# Patient Record
Sex: MALE | Race: WHITE | NOT HISPANIC OR LATINO | Employment: FULL TIME | URBAN - METROPOLITAN AREA
[De-identification: names, ages, dates, MRNs, and addresses within clinical notes are randomized per-mention and may not be internally consistent; named-entity substitution may affect disease eponyms.]

---

## 2023-03-31 LAB — LIPOPROTEIN A SER/PLAS: 123 MG/DL

## 2023-05-28 LAB — TESTOSTERONE (NG/DL) IN SER/PLAS: NORMAL

## 2023-05-30 LAB
ALANINE AMINOTRANSFERASE (SGPT) (U/L) IN SER/PLAS: 59 U/L (ref 10–52)
ALBUMIN (G/DL) IN SER/PLAS: 4.7 G/DL (ref 3.4–5)
ALKALINE PHOSPHATASE (U/L) IN SER/PLAS: 51 U/L (ref 33–120)
AMPHETAMINE (PRESENCE) IN URINE BY SCREEN METHOD: NORMAL
ANION GAP IN SER/PLAS: 9 MMOL/L (ref 10–20)
ASPARTATE AMINOTRANSFERASE (SGOT) (U/L) IN SER/PLAS: 39 U/L (ref 9–39)
BARBITURATES PRESENCE IN URINE BY SCREEN METHOD: NORMAL
BENZODIAZEPINE (PRESENCE) IN URINE BY SCREEN METHOD: NORMAL
BILIRUBIN TOTAL (MG/DL) IN SER/PLAS: 0.9 MG/DL (ref 0–1.2)
CALCIUM (MG/DL) IN SER/PLAS: 9.9 MG/DL (ref 8.6–10.3)
CANNABINOIDS IN URINE BY SCREEN METHOD: NORMAL
CARBON DIOXIDE, TOTAL (MMOL/L) IN SER/PLAS: 31 MMOL/L (ref 21–32)
CHLORIDE (MMOL/L) IN SER/PLAS: 103 MMOL/L (ref 98–107)
CHOLESTEROL (MG/DL) IN SER/PLAS: 128 MG/DL (ref 0–199)
CHOLESTEROL IN HDL (MG/DL) IN SER/PLAS: 45.9 MG/DL
CHOLESTEROL/HDL RATIO: 2.8
COCAINE (PRESENCE) IN URINE BY SCREEN METHOD: NORMAL
CREATININE (MG/DL) IN SER/PLAS: 0.94 MG/DL (ref 0.5–1.3)
DRUG SCREEN COMMENT URINE: NORMAL
FENTANYL URINE: NORMAL
GFR MALE: >90 ML/MIN/1.73M2
GLUCOSE (MG/DL) IN SER/PLAS: 89 MG/DL (ref 74–99)
LDL: 61 MG/DL (ref 0–99)
METHADONE (PRESENCE) IN URINE BY SCREEN METHOD: NORMAL
OPIATES (PRESENCE) IN URINE BY SCREEN METHOD: NORMAL
OXYCODONE (PRESENCE) IN URINE BY SCREEN METHOD: NORMAL
PHENCYCLIDINE (PRESENCE) IN URINE BY SCREEN METHOD: NORMAL
POTASSIUM (MMOL/L) IN SER/PLAS: 4.4 MMOL/L (ref 3.5–5.3)
PROTEIN TOTAL: 6.9 G/DL (ref 6.4–8.2)
SODIUM (MMOL/L) IN SER/PLAS: 139 MMOL/L (ref 136–145)
TESTOSTERONE (NG/DL) IN SER/PLAS: 1256 NG/DL (ref 240–1000)
TRIGLYCERIDE (MG/DL) IN SER/PLAS: 107 MG/DL (ref 0–149)
UREA NITROGEN (MG/DL) IN SER/PLAS: 20 MG/DL (ref 6–23)
VLDL: 21 MG/DL (ref 0–40)

## 2023-08-19 LAB — TESTOSTERONE (NG/DL) IN SER/PLAS: 1348 NG/DL (ref 240–1000)

## 2023-11-07 ENCOUNTER — LAB (OUTPATIENT)
Dept: LAB | Facility: LAB | Age: 43
End: 2023-11-07
Payer: COMMERCIAL

## 2023-11-07 ENCOUNTER — OFFICE VISIT (OUTPATIENT)
Dept: PRIMARY CARE | Facility: CLINIC | Age: 43
End: 2023-11-07
Payer: COMMERCIAL

## 2023-11-07 VITALS
SYSTOLIC BLOOD PRESSURE: 116 MMHG | HEIGHT: 72 IN | HEART RATE: 72 BPM | BODY MASS INDEX: 30.02 KG/M2 | WEIGHT: 221.6 LBS | OXYGEN SATURATION: 98 % | DIASTOLIC BLOOD PRESSURE: 68 MMHG | TEMPERATURE: 98.7 F

## 2023-11-07 DIAGNOSIS — E78.5 HYPERLIPIDEMIA, UNSPECIFIED HYPERLIPIDEMIA TYPE: ICD-10-CM

## 2023-11-07 DIAGNOSIS — E29.1 HYPOGONADISM IN MALE: ICD-10-CM

## 2023-11-07 DIAGNOSIS — E78.41 HIGH SERUM LIPOPROTEIN(A): ICD-10-CM

## 2023-11-07 DIAGNOSIS — K58.9 IRRITABLE BOWEL SYNDROME, UNSPECIFIED TYPE: ICD-10-CM

## 2023-11-07 LAB
ALBUMIN SERPL BCP-MCNC: 4.5 G/DL (ref 3.4–5)
ALP SERPL-CCNC: 51 U/L (ref 33–120)
ALT SERPL W P-5'-P-CCNC: 55 U/L (ref 10–52)
ANION GAP SERPL CALC-SCNC: 11 MMOL/L (ref 10–20)
AST SERPL W P-5'-P-CCNC: 38 U/L (ref 9–39)
BILIRUB SERPL-MCNC: 0.9 MG/DL (ref 0–1.2)
BUN SERPL-MCNC: 16 MG/DL (ref 6–23)
CALCIUM SERPL-MCNC: 9.3 MG/DL (ref 8.6–10.3)
CHLORIDE SERPL-SCNC: 104 MMOL/L (ref 98–107)
CHOLEST SERPL-MCNC: 126 MG/DL (ref 0–199)
CHOLESTEROL/HDL RATIO: 3.4
CO2 SERPL-SCNC: 28 MMOL/L (ref 21–32)
CREAT SERPL-MCNC: 0.95 MG/DL (ref 0.5–1.3)
GFR SERPL CREATININE-BSD FRML MDRD: >90 ML/MIN/1.73M*2
GLUCOSE SERPL-MCNC: 93 MG/DL (ref 74–99)
HDLC SERPL-MCNC: 36.6 MG/DL
NON-HDL CHOLESTEROL: 89 MG/DL (ref 0–149)
POTASSIUM SERPL-SCNC: 4 MMOL/L (ref 3.5–5.3)
PROT SERPL-MCNC: 6.5 G/DL (ref 6.4–8.2)
SODIUM SERPL-SCNC: 139 MMOL/L (ref 136–145)
TESTOST SERPL-MCNC: 783 NG/DL (ref 240–1000)

## 2023-11-07 PROCEDURE — 36415 COLL VENOUS BLD VENIPUNCTURE: CPT

## 2023-11-07 PROCEDURE — 1036F TOBACCO NON-USER: CPT | Performed by: FAMILY MEDICINE

## 2023-11-07 PROCEDURE — 80053 COMPREHEN METABOLIC PANEL: CPT

## 2023-11-07 PROCEDURE — 82465 ASSAY BLD/SERUM CHOLESTEROL: CPT

## 2023-11-07 PROCEDURE — 84403 ASSAY OF TOTAL TESTOSTERONE: CPT

## 2023-11-07 PROCEDURE — 83718 ASSAY OF LIPOPROTEIN: CPT

## 2023-11-07 PROCEDURE — 99214 OFFICE O/P EST MOD 30 MIN: CPT | Performed by: FAMILY MEDICINE

## 2023-11-07 RX ORDER — ROSUVASTATIN CALCIUM 40 MG/1
40 TABLET, COATED ORAL NIGHTLY
Qty: 90 TABLET | Refills: 1 | Status: SHIPPED | OUTPATIENT
Start: 2023-11-07 | End: 2024-05-06 | Stop reason: SDUPTHER

## 2023-11-07 RX ORDER — TESTOSTERONE CYPIONATE 200 MG/ML
200 INJECTION, SOLUTION INTRAMUSCULAR
Qty: 4 ML | Refills: 2 | Status: SHIPPED | OUTPATIENT
Start: 2023-11-07 | End: 2024-02-12 | Stop reason: SDUPTHER

## 2023-11-07 RX ORDER — TESTOSTERONE CYPIONATE 200 MG/ML
200 INJECTION, SOLUTION INTRAMUSCULAR
COMMUNITY
End: 2023-11-07 | Stop reason: SDUPTHER

## 2023-11-07 RX ORDER — DICYCLOMINE HYDROCHLORIDE 20 MG/1
20 TABLET ORAL 3 TIMES DAILY
Qty: 90 TABLET | Refills: 2 | Status: SHIPPED | OUTPATIENT
Start: 2023-11-07 | End: 2024-02-12 | Stop reason: WASHOUT

## 2023-11-07 NOTE — PROGRESS NOTES
Subjective   Patient ID: Zeke Cifuentes is a 43 y.o. male who presents for Med Refill.    HPI Pt is here for refills of medications to treat the following medical conditions. Unless otherwise stated, these conditions are well-controlled, pt is taking medications s Rx, and there are no reported side effects to medications or other treatment: Hyperlipidemia, IBS, Hypogonadism in male.   Last injection was 24 hrs prior to this visit.    Review of Systems   All other systems reviewed and are negative.      Objective   /68   Pulse 72   Temp 37.1 °C (98.7 °F)   Ht 1.829 m (6')   Wt 101 kg (221 lb 9.6 oz)   SpO2 98%   BMI 30.05 kg/m²     Physical Exam  Constitutional:       Appearance: Normal appearance.   Eyes:      Conjunctiva/sclera: Conjunctivae normal.   Cardiovascular:      Rate and Rhythm: Normal rate and regular rhythm.   Pulmonary:      Effort: Pulmonary effort is normal.      Breath sounds: Normal breath sounds.   Abdominal:      Palpations: Abdomen is soft.   Musculoskeletal:         General: Normal range of motion.   Skin:     General: Skin is warm and dry.   Neurological:      Mental Status: He is alert.   Psychiatric:         Mood and Affect: Mood normal.         Assessment/Plan Chronic medications refilled per protocol. Will have pt RTC for a trough reading in his testosterone. He is doing once weekly injections so we will have him draw just prior to his injection. He'll RTC in the next week or two to have this comopleted. We will adjust dose as needed. Otherwise, RTC 1 month

## 2023-11-08 ENCOUNTER — LAB (OUTPATIENT)
Dept: LAB | Facility: LAB | Age: 43
End: 2023-11-08
Payer: COMMERCIAL

## 2023-11-08 DIAGNOSIS — K52.9 CHRONIC DIARRHEA: Primary | ICD-10-CM

## 2023-11-08 DIAGNOSIS — K52.9 CHRONIC DIARRHEA: ICD-10-CM

## 2023-11-08 PROCEDURE — 83630 LACTOFERRIN FECAL (QUAL): CPT

## 2023-11-08 PROCEDURE — 87328 CRYPTOSPORIDIUM AG IA: CPT

## 2023-11-08 PROCEDURE — 87329 GIARDIA AG IA: CPT

## 2023-11-08 NOTE — PROGRESS NOTES
Pt stopped by the office, Dr. Zaidi wants him to complete stool studies for chronic diarrhea. Orders placed and stool sample kit given to pt. Advised to return to the lab Monday-Thursday 8-4:30.

## 2023-11-10 LAB — LACTOFERRIN STL QL IA: POSITIVE

## 2023-11-11 DIAGNOSIS — K52.9 COLITIS: Primary | ICD-10-CM

## 2023-11-11 LAB
CRYPTOSP AG STL QL IA: NEGATIVE
G LAMBLIA AG STL QL IA: NEGATIVE

## 2023-11-11 RX ORDER — METHYLPREDNISOLONE 4 MG/1
TABLET ORAL
Qty: 21 TABLET | Refills: 0 | Status: SHIPPED | OUTPATIENT
Start: 2023-11-11 | End: 2024-02-12 | Stop reason: WASHOUT

## 2023-11-11 NOTE — PROGRESS NOTES
Patient called PCP (supervising physician of this provider) and reported stomach problems. Dr. Zaidi requested Medrol dose pack for colitis sent in for patient.

## 2023-11-13 LAB — O+P STL MICRO: NEGATIVE

## 2023-12-29 DIAGNOSIS — E29.1 HYPOGONADISM IN MALE: ICD-10-CM

## 2024-01-09 ENCOUNTER — LAB (OUTPATIENT)
Dept: LAB | Facility: LAB | Age: 44
End: 2024-01-09
Payer: COMMERCIAL

## 2024-01-09 DIAGNOSIS — E29.1 HYPOGONADISM IN MALE: ICD-10-CM

## 2024-01-09 LAB — TESTOST SERPL-MCNC: 668 NG/DL (ref 240–1000)

## 2024-01-09 PROCEDURE — 36415 COLL VENOUS BLD VENIPUNCTURE: CPT

## 2024-01-09 PROCEDURE — 84403 ASSAY OF TOTAL TESTOSTERONE: CPT

## 2024-02-06 DIAGNOSIS — E29.1 HYPOGONADISM IN MALE: ICD-10-CM

## 2024-02-09 RX ORDER — TESTOSTERONE CYPIONATE 200 MG/ML
200 INJECTION, SOLUTION INTRAMUSCULAR
Qty: 4 ML | Refills: 0 | OUTPATIENT
Start: 2024-02-09

## 2024-02-12 ENCOUNTER — OFFICE VISIT (OUTPATIENT)
Dept: PRIMARY CARE | Facility: CLINIC | Age: 44
End: 2024-02-12
Payer: COMMERCIAL

## 2024-02-12 VITALS
DIASTOLIC BLOOD PRESSURE: 64 MMHG | SYSTOLIC BLOOD PRESSURE: 122 MMHG | HEIGHT: 72 IN | HEART RATE: 79 BPM | WEIGHT: 228 LBS | OXYGEN SATURATION: 98 % | TEMPERATURE: 98.7 F | BODY MASS INDEX: 30.88 KG/M2

## 2024-02-12 DIAGNOSIS — E29.1 HYPOGONADISM IN MALE: ICD-10-CM

## 2024-02-12 DIAGNOSIS — E78.5 HYPERLIPIDEMIA, UNSPECIFIED HYPERLIPIDEMIA TYPE: Primary | ICD-10-CM

## 2024-02-12 PROCEDURE — 1036F TOBACCO NON-USER: CPT | Performed by: FAMILY MEDICINE

## 2024-02-12 PROCEDURE — 99214 OFFICE O/P EST MOD 30 MIN: CPT | Performed by: FAMILY MEDICINE

## 2024-02-12 RX ORDER — TAMSULOSIN HYDROCHLORIDE 0.4 MG/1
0.4 CAPSULE ORAL DAILY
COMMUNITY
End: 2024-03-15 | Stop reason: WASHOUT

## 2024-02-12 RX ORDER — TESTOSTERONE CYPIONATE 200 MG/ML
200 INJECTION, SOLUTION INTRAMUSCULAR
Qty: 4 ML | Refills: 2 | Status: SHIPPED | OUTPATIENT
Start: 2024-02-12 | End: 2024-05-06 | Stop reason: SDUPTHER

## 2024-02-12 NOTE — PROGRESS NOTES
Subjective   Patient ID: Zeke Cifuentes is a 43 y.o. male who presents for Med Refill.    HPI Pt is here for refills of medications to treat the following medical conditions. Unless otherwise stated, these conditions are well-controlled, pt is taking medications s Rx, and there are no reported side effects to medications or other treatment: Hypogonadism in male. Last injection was on 2/6/2024. Last injection level done pm 1/9/2024 with result of 668.     Review of Systems   All other systems reviewed and are negative.      Objective   /64   Pulse 79   Temp 37.1 °C (98.7 °F)   Ht 1.829 m (6')   Wt 103 kg (228 lb)   SpO2 98%   BMI 30.92 kg/m²     Physical Exam  Constitutional:       Appearance: Normal appearance.   Eyes:      Conjunctiva/sclera: Conjunctivae normal.   Cardiovascular:      Rate and Rhythm: Normal rate and regular rhythm.   Pulmonary:      Effort: Pulmonary effort is normal.      Breath sounds: Normal breath sounds.   Abdominal:      Palpations: Abdomen is soft.   Musculoskeletal:         General: Normal range of motion.   Skin:     General: Skin is warm and dry.   Neurological:      Mental Status: He is alert.   Psychiatric:         Mood and Affect: Mood normal.         Assessment/Plan Reviewed lab work with pt. Will get lipid and testosterone at his next lab draw as he is due for lipid.

## 2024-02-28 ENCOUNTER — TELEMEDICINE (OUTPATIENT)
Dept: PRIMARY CARE | Facility: CLINIC | Age: 44
End: 2024-02-28
Payer: COMMERCIAL

## 2024-02-28 DIAGNOSIS — U07.1 COVID-19: Primary | ICD-10-CM

## 2024-02-28 PROCEDURE — 99213 OFFICE O/P EST LOW 20 MIN: CPT | Performed by: FAMILY MEDICINE

## 2024-02-28 PROCEDURE — 1036F TOBACCO NON-USER: CPT | Performed by: FAMILY MEDICINE

## 2024-02-28 RX ORDER — NIRMATRELVIR AND RITONAVIR 300-100 MG
3 KIT ORAL 2 TIMES DAILY
Qty: 30 TABLET | Refills: 0 | Status: SHIPPED | OUTPATIENT
Start: 2024-02-28 | End: 2024-03-04

## 2024-02-28 RX ORDER — ELUXADOLINE 100 MG/1
1 TABLET, FILM COATED ORAL
COMMUNITY
Start: 2024-02-10

## 2024-02-28 NOTE — PROGRESS NOTES
Subjective   Patient ID: Zeke Cifuentes is a 43 y.o. male who presents for Positive COVID.    HPI     States on 2/26/2024 in the evening around 10 pm and 2/27/2024 he had fever and had no smell and had two positive tests this morning. Took Motrin and Tyelnol which broke the fever, temperature this morning is 99.6 F. C/o fever/chills, body aches, head pounding. Has taken Dayquil and the NetiPot, and has been using Afrin when very congested. Is requesting Rx of Paxlovid.     Review of Systems   All other systems reviewed and are negative.      Objective   There were no vitals taken for this visit.    Physical Exam  Vitals reviewed.   Constitutional:       General: He is awake.      Appearance: Normal appearance. He is well-developed.   HENT:      Head: Normocephalic and atraumatic.   Pulmonary:      Effort: Pulmonary effort is normal.   Musculoskeletal:      Cervical back: Full passive range of motion without pain.   Skin:     General: Skin is warm and dry.      Findings: No rash.   Neurological:      General: No focal deficit present.      Mental Status: He is alert and oriented to person, place, and time.      Cranial Nerves: No facial asymmetry.      Motor: Motor function is intact.      Gait: Gait is intact.   Psychiatric:         Attention and Perception: Attention normal.         Mood and Affect: Mood and affect normal.       Examination limited secondary to virtual visit.      Assessment/Plan   Diagnoses and all orders for this visit:  COVID-19  -     nirmatrelvir-ritonavir (Paxlovid) 300 mg (150 mg x 2)-100 mg tablet therapy pack; Take 3 tablets by mouth 2 times a day for 5 days. Follow the instructions on the package    Increase rest and fluids. Follow [facility] quarantine recommendations. Hold rosuvastatin.

## 2024-03-14 NOTE — PROGRESS NOTES
HPI   5/11/23 - sinus infection about 1 month ago, s/p doxycycline and prednisone. Sx's improved however c/o persistent L sided congestion. Opted to discontinue mupirocin voluntarily 2/2 no significant improvement. He was applying the ointment with his finger previously. Prescribed mupirocin again today and reviewed proper application instructions as well. Otherwise, he is now about 2 years post-op with no recurrence and doing well overall.   3/15/24 - The patient was doing well with the sinuses/nose over the past year. He reports having Covid approximately 20-25 days ago (in mid-to-late February 2024). He reports having nasal obstruction at night. He is hoping this is only related to his allergies. He has been using Xhance for the past 4 days. He is also taking allergy medicine. He has been using Afrin for the past week.    Postoperative Diagnoses:  1. Bilateral chronic maxillary sinusitis  2. Nasal septal deviation  3. Bilateral inferior turbinate hypertrophy  4. Bilateral aisha bullosa  5. Bilateral nasal sinus polyp     Operation/Procedures:  1. Bilateral endoscopic maxillary antrostomies with removal of tissue from sinuses - modifier 22  2. Septoplasty  3. Bilateral inferior turbinate submucous resection  4. Bilateral aisha bullosa resection  5. Imaged guidance navigation - extradural     Operative Findings:  1. 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 Pierson Splint sutured to the septum bilaterally  3. Absorbable hemostatic material in the ethmoids    Surgeon: Jimenez Campbell MD  Assistant(s): None  EBL: 100 ml  Anesthesia: General and local    Review of Systems   Negative for constitutional, eyes, cardiac, pulmonary, hepatic, renal, digestive, hematologic, epileptic, syncopal, musculoskeletal, mental health, integumentary, hypertensive, lipid, arthritic, diabetic, thyroid or neurologic disorders (except as listed in the HPI, PMH and Problem  List).       Assessment   Zeke Cifuentes is a 44 y.o. male h/o CRS, DNS, s/p FESS (maxillary antrostomy, aisha bullosa resection). The patient had surgery 03/15/21.  3/24/21- Expected post op swelling today. Debridement performed as above.  4/15/21- Expected post op swelling, resolving infection of the right maxillary sinus. Debridement performed as above. Continue irrigations and xhance.   5/19/21- Anterior septal crusting, start mupirocin BID, irrigations and Xhance to once daily.  10/20/21- Patient doing well, mild anterior nasal dryness, stopped Xhance.   4/14/22- doing well overall, start mupirocin, avoid blowing nose for 1 month, nasal hygeine  5/11/23 - sinus infection about 1 month ago, s/p doxy and prednisone. Sx's improved. With chronic left nasal dryness. Advised mupirocin x 1 month. Rx and reduce/careful with nose hair trimming.  3/15/24 - No infection. Some ITH. Pt had Covid in mid-to-late February 2024. Rec Xhance daily for at least 1 month, continue for 2 months if not improved. Continue saline irrigations PRN.    Plan   I previously reviewed the notes from Dr. Abraham's office from 02/08/23. This is contributing to my history and assessment: Noted to have an episode of acute sinusitis and given prednisone tablets. Received 10d course of doxycycline prior to last evaluation with Dr. Abraham.     Patient was instructed to stop using Afrin.  Patient was instructed to continue using Xhance, 1 spray in each nostril BID for at least 1 month. If he does not feel improved after 1 month, he was instructed to continue Xhance for 1 additional month. If he does not feel improved after 2 months of Xhance, he was recommended to contact our office.  Recommended nasal saline irrigations as needed. Previously discussed that this should help to clean away crusts more gently than blowing the nose.  Follow up in 1 year.    Jimenez Campbell MD [facility]  Division of Rhinology, Sinus, and Skull Base Surgery       Exam   General:  This is a healthy appearing male who appears his stated age. The patient is alert and appropriately verbally conversant without hoarseness.  Face: The face was inspected and no cutaneous masses or lesions were visualized. There was no erythema or edema noted. Facial movement was symmetric without weakness. No skin lesions were detected.   Eyes: Extra-ocular muscle function was intact. No nystagmus was observed. Pupils were equal.      Nose: Examination of the nose revealed the nasal dorsum to be midline. Intranasal exam reveals the septum is healthy without perforation. The inferior turbinates were hypertrophic and edematous with drainage. See below procedure note as applicable for further exam.    Procedure Note:  Procedure: Nasal endoscopy - bilateral  Indication: Chronic rhinosinusitis, post operative from sinus surgery  Informed Consent obtained: risks, benefits, alternatives, and expectations discussed with patient and the patient wishes to proceed.     Findings: After anesthesia and decongestion with topical lidocaine and Afrin spray, the nasal cavities were examined with a zero and/or 30-degree endoscope. Nasal cavity is patent and clear. The maxillary sinuses are widely patent with mild edema. The inferior turbinates were hypertrophic and edematous with drainage. Otherwise the middle turbinates, superior turbinates, and meatuses are normal and sphenoethmoid recess, nasal cavity and nasopharynx are normal. There is no CSF leak. Well-healed overall. The patient tolerated the procedure well and there were no complications.       Scribe Attestation  By signing my name below, I, Denisse Azar, attest that this documentation has been prepared under the direction and in the presence of Jimenez Campbell MD. All medical record entries made by the Scribe were at my direction or personally dictated by me. I have reviewed the chart and agree that the record accurately reflects my personal performance of the  history, physical exam, discussion and plan.

## 2024-03-14 NOTE — PATIENT INSTRUCTIONS
PATIENT INSTRUCTIONS ARE COMPLETE and READY TO PRINT    Please stop using Afrin regularly because consistent Afrin use can cause your nose to become addicted to it. Please instead use Xhance, which should help reduce the swelling of your turbinates.    Xhance:  Please start using Xhance nasal spray. Do 1 spray in each nostril twice a day for at least 1 month. If you don't feel improve after 1 month, continue Xhance for 1 more month. If you are still not feeling improved after 2 months of Xhance, call our office at [phone]. To watch an Xhance demonstration video, please visit [email or web address]    Saline irrigations:  I recommend occasional saline irrigations as needed. Saline irrigations help clean away crusts more gently than blowing your nose.  If you decide to do a nasal irrigation, please irrigate first before using Xhance. Otherwise, you will wash Xhance out of your nose with the irrigation.    Recipe to Make your Own Nasal Saline Solution:  Mix 8 ounces of distilled water or tap water (be sure to boil tap water, then let it cool down) with 1/2 teaspoon of baking soda and 1/2 teaspoon of table salt and shake bottle to dissolve.     Follow up:  Please follow up with me in 1 year for reevaluation, or sooner with any questions or concerns. Please feel free to contact my office by calling [phone] with any questions.     Jobibe Attestation  By signing my name below, I, Denisse Azar, attest that this documentation has been prepared under the direction and in the presence of Jimenez Campbell MD. All medical record entries made by the Scribe were at my direction or personally dictated by me. I have reviewed the chart and agree that the record accurately reflects my personal performance of the history, physical exam, discussion and plan.

## 2024-03-15 ENCOUNTER — OFFICE VISIT (OUTPATIENT)
Dept: OTOLARYNGOLOGY | Facility: CLINIC | Age: 44
End: 2024-03-15
Payer: COMMERCIAL

## 2024-03-15 VITALS — WEIGHT: 224.4 LBS | TEMPERATURE: 97.4 F | BODY MASS INDEX: 30.43 KG/M2

## 2024-03-15 DIAGNOSIS — J34.3 HYPERTROPHY OF BOTH INFERIOR NASAL TURBINATES: ICD-10-CM

## 2024-03-15 DIAGNOSIS — J32.4 CHRONIC PANSINUSITIS: Primary | ICD-10-CM

## 2024-03-15 PROCEDURE — 99213 OFFICE O/P EST LOW 20 MIN: CPT | Performed by: OTOLARYNGOLOGY

## 2024-03-15 PROCEDURE — 1036F TOBACCO NON-USER: CPT | Performed by: OTOLARYNGOLOGY

## 2024-03-15 PROCEDURE — 31231 NASAL ENDOSCOPY DX: CPT | Performed by: OTOLARYNGOLOGY

## 2024-03-15 ASSESSMENT — PATIENT HEALTH QUESTIONNAIRE - PHQ9
2. FEELING DOWN, DEPRESSED OR HOPELESS: NOT AT ALL
1. LITTLE INTEREST OR PLEASURE IN DOING THINGS: NOT AT ALL
SUM OF ALL RESPONSES TO PHQ9 QUESTIONS 1 AND 2: 0

## 2024-04-25 ENCOUNTER — APPOINTMENT (OUTPATIENT)
Dept: OTOLARYNGOLOGY | Facility: CLINIC | Age: 44
End: 2024-04-25
Payer: COMMERCIAL

## 2024-05-06 ENCOUNTER — OFFICE VISIT (OUTPATIENT)
Dept: PRIMARY CARE | Facility: CLINIC | Age: 44
End: 2024-05-06
Payer: COMMERCIAL

## 2024-05-06 ENCOUNTER — LAB (OUTPATIENT)
Dept: LAB | Facility: LAB | Age: 44
End: 2024-05-06
Payer: COMMERCIAL

## 2024-05-06 VITALS
HEART RATE: 78 BPM | BODY MASS INDEX: 29.8 KG/M2 | DIASTOLIC BLOOD PRESSURE: 60 MMHG | OXYGEN SATURATION: 97 % | HEIGHT: 72 IN | SYSTOLIC BLOOD PRESSURE: 110 MMHG | WEIGHT: 220 LBS

## 2024-05-06 DIAGNOSIS — E29.1 HYPOGONADISM IN MALE: ICD-10-CM

## 2024-05-06 DIAGNOSIS — E78.41 HIGH SERUM LIPOPROTEIN(A): ICD-10-CM

## 2024-05-06 DIAGNOSIS — E78.5 HYPERLIPIDEMIA, UNSPECIFIED HYPERLIPIDEMIA TYPE: ICD-10-CM

## 2024-05-06 LAB
ALBUMIN SERPL BCP-MCNC: 4.6 G/DL (ref 3.4–5)
ALP SERPL-CCNC: 45 U/L (ref 33–120)
ALT SERPL W P-5'-P-CCNC: 44 U/L (ref 10–52)
ANION GAP SERPL CALC-SCNC: 14 MMOL/L (ref 10–20)
AST SERPL W P-5'-P-CCNC: 31 U/L (ref 9–39)
BILIRUB SERPL-MCNC: 1.2 MG/DL (ref 0–1.2)
BUN SERPL-MCNC: 18 MG/DL (ref 6–23)
CALCIUM SERPL-MCNC: 9.7 MG/DL (ref 8.6–10.3)
CHLORIDE SERPL-SCNC: 103 MMOL/L (ref 98–107)
CHOLEST SERPL-MCNC: 126 MG/DL (ref 0–199)
CHOLESTEROL/HDL RATIO: 2.7
CO2 SERPL-SCNC: 26 MMOL/L (ref 21–32)
CREAT SERPL-MCNC: 0.93 MG/DL (ref 0.5–1.3)
EGFRCR SERPLBLD CKD-EPI 2021: >90 ML/MIN/1.73M*2
GLUCOSE SERPL-MCNC: 66 MG/DL (ref 74–99)
HDLC SERPL-MCNC: 46.3 MG/DL
LDLC SERPL CALC-MCNC: 63 MG/DL
NON HDL CHOLESTEROL: 80 MG/DL (ref 0–149)
POTASSIUM SERPL-SCNC: 4 MMOL/L (ref 3.5–5.3)
PROT SERPL-MCNC: 7 G/DL (ref 6.4–8.2)
SODIUM SERPL-SCNC: 139 MMOL/L (ref 136–145)
TRIGL SERPL-MCNC: 86 MG/DL (ref 0–149)
VLDL: 17 MG/DL (ref 0–40)

## 2024-05-06 PROCEDURE — 36415 COLL VENOUS BLD VENIPUNCTURE: CPT

## 2024-05-06 PROCEDURE — 1036F TOBACCO NON-USER: CPT | Performed by: FAMILY MEDICINE

## 2024-05-06 PROCEDURE — 84403 ASSAY OF TOTAL TESTOSTERONE: CPT

## 2024-05-06 PROCEDURE — 80053 COMPREHEN METABOLIC PANEL: CPT

## 2024-05-06 PROCEDURE — 80061 LIPID PANEL: CPT

## 2024-05-06 PROCEDURE — 99214 OFFICE O/P EST MOD 30 MIN: CPT | Performed by: FAMILY MEDICINE

## 2024-05-06 RX ORDER — MULTIVITAMIN
1 TABLET ORAL
COMMUNITY

## 2024-05-06 RX ORDER — TESTOSTERONE CYPIONATE 200 MG/ML
200 INJECTION, SOLUTION INTRAMUSCULAR
Qty: 4 ML | Refills: 2 | Status: SHIPPED | OUTPATIENT
Start: 2024-05-12 | End: 2024-08-10

## 2024-05-06 RX ORDER — ROSUVASTATIN CALCIUM 40 MG/1
40 TABLET, COATED ORAL NIGHTLY
Qty: 90 TABLET | Refills: 1 | OUTPATIENT
Start: 2024-05-06

## 2024-05-06 RX ORDER — ROSUVASTATIN CALCIUM 40 MG/1
40 TABLET, COATED ORAL NIGHTLY
Qty: 90 TABLET | Refills: 1 | Status: SHIPPED | OUTPATIENT
Start: 2024-05-06 | End: 2024-11-02

## 2024-05-06 NOTE — PROGRESS NOTES
Subjective   Patient ID: Zeke Cifuentes is a 44 y.o. male who presents for Med Refill.    HPI     Pt is here for refills of medications to treat the following medical conditions. Unless otherwise stated, these conditions are well-controlled, pt is taking medications as Rx, and there are no reported side effects to medications or other treatment: Hypogonadism in male - Last testosterone shot was on 4/30/2024. Hyperlipidemia.     Review of Systems   All other systems reviewed and are negative.    Objective   /60   Pulse 78   Ht 1.829 m (6')   Wt 99.8 kg (220 lb)   SpO2 97%   BMI 29.84 kg/m²     Physical Exam  Vitals reviewed.   Constitutional:       General: He is awake.      Appearance: Normal appearance. He is well-developed.   HENT:      Head: Normocephalic and atraumatic.   Cardiovascular:      Rate and Rhythm: Normal rate.   Pulmonary:      Effort: Pulmonary effort is normal.   Musculoskeletal:      Cervical back: Full passive range of motion without pain.      Right lower leg: No edema.      Left lower leg: No edema.   Skin:     General: Skin is warm and dry.      Findings: No lesion or rash.   Neurological:      General: No focal deficit present.      Mental Status: He is alert and oriented to person, place, and time.      Cranial Nerves: No facial asymmetry.      Motor: Motor function is intact.      Gait: Gait is intact.   Psychiatric:         Attention and Perception: Attention normal.         Mood and Affect: Mood and affect normal.         Assessment/Plan   Diagnoses and all orders for this visit:  Hypogonadism in male  -     Testosterone; Future  Hyperlipidemia, unspecified hyperlipidemia type  -     Comprehensive metabolic panel; Future  -     Lipid panel; Future  -     rosuvastatin (Crestor) 40 mg tablet; Take 1 tablet (40 mg) by mouth once daily at bedtime.  High serum lipoprotein(a)  -     rosuvastatin (Crestor) 40 mg tablet; Take 1 tablet (40 mg) by mouth once daily at  bedtime.    Chronic medications refilled as above. Labs above drawn today. Will make any necessary changes to meds based on labs if applicable. RTC 3 months for refills, sooner if issues.

## 2024-05-07 LAB — TESTOST SERPL-MCNC: 863 NG/DL (ref 240–1000)

## 2024-05-09 ENCOUNTER — APPOINTMENT (OUTPATIENT)
Dept: OTOLARYNGOLOGY | Facility: CLINIC | Age: 44
End: 2024-05-09
Payer: COMMERCIAL

## 2024-05-23 ENCOUNTER — APPOINTMENT (OUTPATIENT)
Dept: OTOLARYNGOLOGY | Facility: CLINIC | Age: 44
End: 2024-05-23
Payer: COMMERCIAL

## 2024-06-12 ENCOUNTER — OFFICE VISIT (OUTPATIENT)
Dept: PRIMARY CARE | Facility: CLINIC | Age: 44
End: 2024-06-12
Payer: COMMERCIAL

## 2024-06-12 VITALS
SYSTOLIC BLOOD PRESSURE: 118 MMHG | BODY MASS INDEX: 29.53 KG/M2 | WEIGHT: 218 LBS | HEART RATE: 86 BPM | DIASTOLIC BLOOD PRESSURE: 60 MMHG | OXYGEN SATURATION: 97 % | HEIGHT: 72 IN

## 2024-06-12 DIAGNOSIS — G47.9 SLEEP DISORDER: ICD-10-CM

## 2024-06-12 DIAGNOSIS — G47.30 SLEEP APNEA, UNSPECIFIED TYPE: Primary | ICD-10-CM

## 2024-06-12 DIAGNOSIS — G47.10 HYPERSOMNOLENCE: ICD-10-CM

## 2024-06-12 PROCEDURE — 99214 OFFICE O/P EST MOD 30 MIN: CPT | Performed by: FAMILY MEDICINE

## 2024-06-12 PROCEDURE — 1036F TOBACCO NON-USER: CPT | Performed by: FAMILY MEDICINE

## 2024-06-12 RX ORDER — AMOXICILLIN 500 MG/1
CAPSULE ORAL
COMMUNITY
Start: 2024-06-07

## 2024-06-12 RX ORDER — ISOTRETINOIN 40 MG/1
40 CAPSULE ORAL
COMMUNITY

## 2024-06-12 NOTE — PROGRESS NOTES
Subjective   Patient ID: Zeke Cifuentes is a 44 y.o. male who presents for Fatigue.    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 Patient denies any difficulty falling or staying asleep. Averages about 7-10 hours of sleep nightly.    Review of Systems   All other systems reviewed and are negative.      Objective   /60   Pulse 86   Ht 1.829 m (6')   Wt 98.9 kg (218 lb)   SpO2 97%   BMI 29.57 kg/m²     Physical Exam  Constitutional:       Appearance: Normal appearance.   Eyes:      Conjunctiva/sclera: Conjunctivae normal.   Cardiovascular:      Rate and Rhythm: Normal rate and regular rhythm.   Pulmonary:      Effort: Pulmonary effort is normal.      Breath sounds: Normal breath sounds.   Abdominal:      Palpations: Abdomen is soft.   Musculoskeletal:         General: Normal range of motion.   Skin:     General: Skin is warm and dry.   Neurological:      Mental Status: He is alert.   Psychiatric:         Mood and Affect: Mood normal.       Assessment/Plan   Diagnoses and all orders for this visit:  Sleep apnea, unspecified type  Sleep disorder  Hypersomnolence     Plan is to do home sleep study. Discussed possibility of his TRT contributing to TYSHAWN. Plan to see back ASAP once testing is complete. All recent blood work reviewed with pt w/o concerns -- no additional blood work needed at this time.

## 2024-06-20 DIAGNOSIS — G47.9 SLEEP DISORDER: ICD-10-CM

## 2024-06-20 DIAGNOSIS — G47.10 HYPERSOMNOLENCE: ICD-10-CM

## 2024-06-20 DIAGNOSIS — G47.30 SLEEP APNEA, UNSPECIFIED TYPE: ICD-10-CM

## 2024-06-24 DIAGNOSIS — N52.9 ERECTILE DYSFUNCTION, UNSPECIFIED ERECTILE DYSFUNCTION TYPE: ICD-10-CM

## 2024-06-26 RX ORDER — SILDENAFIL 100 MG/1
100 TABLET, FILM COATED ORAL
Qty: 8 TABLET | Refills: 0 | Status: SHIPPED | OUTPATIENT
Start: 2024-06-26

## 2024-07-12 ENCOUNTER — PROCEDURE VISIT (OUTPATIENT)
Dept: SLEEP MEDICINE | Facility: HOSPITAL | Age: 44
End: 2024-07-12
Payer: COMMERCIAL

## 2024-07-12 DIAGNOSIS — G47.9 SLEEP DISORDER: ICD-10-CM

## 2024-07-12 DIAGNOSIS — G47.30 SLEEP APNEA, UNSPECIFIED TYPE: ICD-10-CM

## 2024-07-12 DIAGNOSIS — G47.10 HYPERSOMNOLENCE: ICD-10-CM

## 2024-07-12 PROCEDURE — 95806 SLEEP STUDY UNATT&RESP EFFT: CPT | Performed by: INTERNAL MEDICINE

## 2024-07-28 DIAGNOSIS — E29.1 HYPOGONADISM IN MALE: ICD-10-CM

## 2024-07-29 NOTE — TELEPHONE ENCOUNTER
Patient has appointment the end of this week but is requesting a refill because he will be out of medication before his appointment. I called and verified with pharmacy, he is due for refill. Spoke with provider, Rx called into [facility] via phone with pharmacist.

## 2024-07-31 RX ORDER — TESTOSTERONE CYPIONATE 200 MG/ML
INJECTION, SOLUTION INTRAMUSCULAR
Qty: 4 ML | Refills: 0 | OUTPATIENT
Start: 2024-07-31

## 2024-08-06 ENCOUNTER — APPOINTMENT (OUTPATIENT)
Dept: PRIMARY CARE | Facility: CLINIC | Age: 44
End: 2024-08-06
Payer: COMMERCIAL

## 2024-08-08 ENCOUNTER — PATIENT MESSAGE (OUTPATIENT)
Dept: PRIMARY CARE | Facility: CLINIC | Age: 44
End: 2024-08-08
Payer: COMMERCIAL

## 2024-08-08 DIAGNOSIS — N52.9 ERECTILE DYSFUNCTION, UNSPECIFIED ERECTILE DYSFUNCTION TYPE: ICD-10-CM

## 2024-08-09 RX ORDER — SILDENAFIL 100 MG/1
100 TABLET, FILM COATED ORAL
Qty: 8 TABLET | Refills: 0 | Status: SHIPPED | OUTPATIENT
Start: 2024-08-09

## 2024-08-09 RX ORDER — SILDENAFIL 100 MG/1
100 TABLET, FILM COATED ORAL AS NEEDED
Qty: 10 TABLET | Refills: 11 | Status: SHIPPED | OUTPATIENT
Start: 2024-08-09 | End: 2025-08-09

## 2024-08-13 ENCOUNTER — APPOINTMENT (OUTPATIENT)
Dept: PRIMARY CARE | Facility: CLINIC | Age: 44
End: 2024-08-13
Payer: COMMERCIAL

## 2024-08-21 DIAGNOSIS — E29.1 HYPOGONADISM IN MALE: ICD-10-CM

## 2024-08-22 RX ORDER — TESTOSTERONE CYPIONATE 200 MG/ML
INJECTION, SOLUTION INTRAMUSCULAR
Qty: 4 ML | Refills: 0 | OUTPATIENT
Start: 2024-08-22

## 2024-08-23 ENCOUNTER — APPOINTMENT (OUTPATIENT)
Dept: PRIMARY CARE | Facility: CLINIC | Age: 44
End: 2024-08-23
Payer: COMMERCIAL

## 2024-08-23 VITALS
SYSTOLIC BLOOD PRESSURE: 136 MMHG | HEART RATE: 94 BPM | DIASTOLIC BLOOD PRESSURE: 68 MMHG | WEIGHT: 221.6 LBS | BODY MASS INDEX: 30.02 KG/M2 | TEMPERATURE: 98.7 F | HEIGHT: 72 IN | OXYGEN SATURATION: 97 %

## 2024-08-23 DIAGNOSIS — E29.1 HYPOGONADISM IN MALE: Primary | ICD-10-CM

## 2024-08-23 DIAGNOSIS — G47.33 OSA (OBSTRUCTIVE SLEEP APNEA): ICD-10-CM

## 2024-08-23 DIAGNOSIS — N52.9 ERECTILE DYSFUNCTION, UNSPECIFIED ERECTILE DYSFUNCTION TYPE: ICD-10-CM

## 2024-08-23 PROCEDURE — 99214 OFFICE O/P EST MOD 30 MIN: CPT | Performed by: FAMILY MEDICINE

## 2024-08-23 PROCEDURE — 3008F BODY MASS INDEX DOCD: CPT | Performed by: FAMILY MEDICINE

## 2024-08-23 PROCEDURE — 1036F TOBACCO NON-USER: CPT | Performed by: FAMILY MEDICINE

## 2024-08-23 RX ORDER — TESTOSTERONE CYPIONATE 200 MG/ML
200 INJECTION, SOLUTION INTRAMUSCULAR
Qty: 4 ML | Refills: 2 | Status: SHIPPED | OUTPATIENT
Start: 2024-08-23 | End: 2024-11-21

## 2024-08-23 RX ORDER — SILDENAFIL 100 MG/1
100 TABLET, FILM COATED ORAL AS NEEDED
Qty: 8 TABLET | Refills: 11 | Status: SHIPPED | OUTPATIENT
Start: 2024-08-23 | End: 2025-08-23

## 2024-08-23 ASSESSMENT — PATIENT HEALTH QUESTIONNAIRE - PHQ9
2. FEELING DOWN, DEPRESSED OR HOPELESS: NOT AT ALL
SUM OF ALL RESPONSES TO PHQ9 QUESTIONS 1 AND 2: 0
1. LITTLE INTEREST OR PLEASURE IN DOING THINGS: NOT AT ALL

## 2024-08-23 NOTE — PROGRESS NOTES
"Subjective   Patient ID: Zeke Cifuentes is a 44 y.o. male who presents for Med Refill and Review Sleep Study Results.    HPI Pt is here for refills of medications to treat the following medical conditions. Unless otherwise stated, these conditions are well-controlled, pt is taking medications s Rx, and there are no reported side effects to medications or other treatment: Hypogonadism      Here to go over results of his sleep study.       Review of Systems   All other systems reviewed and are negative.      Objective   /68 (BP Location: Right arm, Patient Position: Sitting, BP Cuff Size: Large adult)   Pulse 94   Temp 37.1 °C (98.7 °F) (Oral)   Ht 1.816 m (5' 11.5\")   Wt 101 kg (221 lb 9.6 oz)   SpO2 97%   BMI 30.48 kg/m²     Physical Exam  Constitutional:       Appearance: Normal appearance.   Eyes:      Conjunctiva/sclera: Conjunctivae normal.   Cardiovascular:      Rate and Rhythm: Normal rate and regular rhythm.   Pulmonary:      Effort: Pulmonary effort is normal.      Breath sounds: Normal breath sounds.   Abdominal:      Palpations: Abdomen is soft.   Musculoskeletal:         General: Normal range of motion.   Skin:     General: Skin is warm and dry.   Neurological:      Mental Status: He is alert.   Psychiatric:         Mood and Affect: Mood normal.         Assessment/Plan   Diagnoses and all orders for this visit:  Hypogonadism in male  -     testosterone cypionate (Depo-Testosterone) 200 mg/mL injection; Inject 1 mL (200 mg) into the muscle every 7 days.  -     Testosterone; Future (will get on day before injection)  TYSHAWN (obstructive sleep apnea)        - Reviewed sleep study results with pt. He has moderate-severe TYSHAWN. Recommendation for CPAP with humidification as recommended, as well as autoPAP to determine settings. Will get this set up ASAP.        - He'll RTC 30 days after completed CPAP therapy for follow up  Erectile dysfunction, unspecified erectile dysfunction type  -     " sildenafil (Viagra) 100 mg tablet; Take 1 tablet (100 mg) by mouth if needed for erectile dysfunction.

## 2024-08-29 DIAGNOSIS — G47.33 OSA (OBSTRUCTIVE SLEEP APNEA): ICD-10-CM

## 2024-08-29 NOTE — PROGRESS NOTES
Patient had previous sleep study done, results were reviewed at patients last office visit and patients needs set up with [facility].   Order faxed to [facility], phone number , fax number .

## 2024-10-23 ENCOUNTER — LAB (OUTPATIENT)
Dept: LAB | Facility: LAB | Age: 44
End: 2024-10-23
Payer: COMMERCIAL

## 2024-10-23 DIAGNOSIS — E29.1 HYPOGONADISM IN MALE: ICD-10-CM

## 2024-10-23 LAB — TESTOST SERPL-MCNC: 590 NG/DL (ref 240–1000)

## 2024-10-23 PROCEDURE — 84403 ASSAY OF TOTAL TESTOSTERONE: CPT

## 2024-10-23 PROCEDURE — 36415 COLL VENOUS BLD VENIPUNCTURE: CPT

## 2024-11-05 DIAGNOSIS — E78.41 HIGH SERUM LIPOPROTEIN(A): ICD-10-CM

## 2024-11-05 DIAGNOSIS — E78.5 HYPERLIPIDEMIA, UNSPECIFIED HYPERLIPIDEMIA TYPE: ICD-10-CM

## 2024-11-05 RX ORDER — ROSUVASTATIN CALCIUM 40 MG/1
40 TABLET, COATED ORAL NIGHTLY
Qty: 90 TABLET | Refills: 1 | OUTPATIENT
Start: 2024-11-05 | End: 2025-05-04

## 2024-11-11 ENCOUNTER — APPOINTMENT (OUTPATIENT)
Dept: PRIMARY CARE | Facility: CLINIC | Age: 44
End: 2024-11-11
Payer: COMMERCIAL

## 2024-11-12 ENCOUNTER — APPOINTMENT (OUTPATIENT)
Dept: PRIMARY CARE | Facility: CLINIC | Age: 44
End: 2024-11-12
Payer: COMMERCIAL

## 2024-11-13 DIAGNOSIS — E29.1 HYPOGONADISM IN MALE: ICD-10-CM

## 2024-11-14 RX ORDER — TESTOSTERONE CYPIONATE 200 MG/ML
INJECTION, SOLUTION INTRAMUSCULAR
Qty: 4 ML | Refills: 0 | OUTPATIENT
Start: 2024-11-14

## 2024-11-15 DIAGNOSIS — E29.1 HYPOGONADISM IN MALE: ICD-10-CM

## 2024-11-15 RX ORDER — TESTOSTERONE CYPIONATE 200 MG/ML
200 INJECTION, SOLUTION INTRAMUSCULAR
Qty: 4 ML | Refills: 0 | Status: CANCELLED | OUTPATIENT
Start: 2024-11-15 | End: 2024-12-15

## 2024-11-15 NOTE — TELEPHONE ENCOUNTER
Patient called stating she does not have enough medication to last until her next appointment and is requesting a refill.   Spoke with provider, Rx called into [facility] pharmacy via phone with [facility] pharmacist.

## 2024-11-21 ENCOUNTER — APPOINTMENT (OUTPATIENT)
Dept: PRIMARY CARE | Facility: CLINIC | Age: 44
End: 2024-11-21
Payer: COMMERCIAL

## 2024-11-21 DIAGNOSIS — E29.1 HYPOGONADISM IN MALE: ICD-10-CM

## 2024-11-21 DIAGNOSIS — E78.5 HYPERLIPIDEMIA, UNSPECIFIED HYPERLIPIDEMIA TYPE: ICD-10-CM

## 2024-11-21 NOTE — PROGRESS NOTES
Patient has upcoming appointment and is requesting lab orders placed to have labs drawn prior to office visit.   Spoke with provider, orders placed.

## 2024-11-22 ENCOUNTER — LAB (OUTPATIENT)
Dept: LAB | Facility: LAB | Age: 44
End: 2024-11-22
Payer: COMMERCIAL

## 2024-11-22 DIAGNOSIS — E78.5 HYPERLIPIDEMIA, UNSPECIFIED HYPERLIPIDEMIA TYPE: ICD-10-CM

## 2024-11-22 DIAGNOSIS — E29.1 HYPOGONADISM IN MALE: ICD-10-CM

## 2024-11-22 LAB
ALBUMIN SERPL BCP-MCNC: 4.7 G/DL (ref 3.4–5)
ALP SERPL-CCNC: 54 U/L (ref 33–120)
ALT SERPL W P-5'-P-CCNC: 61 U/L (ref 10–52)
ANION GAP SERPL CALC-SCNC: 9 MMOL/L (ref 10–20)
AST SERPL W P-5'-P-CCNC: 33 U/L (ref 9–39)
BILIRUB SERPL-MCNC: 1.5 MG/DL (ref 0–1.2)
BUN SERPL-MCNC: 20 MG/DL (ref 6–23)
CALCIUM SERPL-MCNC: 9.2 MG/DL (ref 8.6–10.3)
CHLORIDE SERPL-SCNC: 102 MMOL/L (ref 98–107)
CHOLEST SERPL-MCNC: 122 MG/DL (ref 0–199)
CHOLESTEROL/HDL RATIO: 3
CO2 SERPL-SCNC: 31 MMOL/L (ref 21–32)
CREAT SERPL-MCNC: 1.11 MG/DL (ref 0.5–1.3)
EGFRCR SERPLBLD CKD-EPI 2021: 84 ML/MIN/1.73M*2
GLUCOSE SERPL-MCNC: 75 MG/DL (ref 74–99)
HDLC SERPL-MCNC: 40.9 MG/DL
LDLC SERPL CALC-MCNC: 61 MG/DL
NON HDL CHOLESTEROL: 81 MG/DL (ref 0–149)
POTASSIUM SERPL-SCNC: 4.3 MMOL/L (ref 3.5–5.3)
PROT SERPL-MCNC: 6.9 G/DL (ref 6.4–8.2)
SODIUM SERPL-SCNC: 138 MMOL/L (ref 136–145)
TESTOST SERPL-MCNC: 1276 NG/DL (ref 240–1000)
TRIGL SERPL-MCNC: 99 MG/DL (ref 0–149)
VLDL: 20 MG/DL (ref 0–40)

## 2024-11-22 PROCEDURE — 80061 LIPID PANEL: CPT

## 2024-11-22 PROCEDURE — 36415 COLL VENOUS BLD VENIPUNCTURE: CPT

## 2024-11-22 PROCEDURE — 80053 COMPREHEN METABOLIC PANEL: CPT

## 2024-11-22 PROCEDURE — 84403 ASSAY OF TOTAL TESTOSTERONE: CPT

## 2024-11-27 ENCOUNTER — APPOINTMENT (OUTPATIENT)
Dept: PRIMARY CARE | Facility: CLINIC | Age: 44
End: 2024-11-27
Payer: COMMERCIAL

## 2024-11-27 VITALS
WEIGHT: 225 LBS | HEIGHT: 72 IN | SYSTOLIC BLOOD PRESSURE: 110 MMHG | DIASTOLIC BLOOD PRESSURE: 60 MMHG | OXYGEN SATURATION: 97 % | BODY MASS INDEX: 30.48 KG/M2 | HEART RATE: 70 BPM

## 2024-11-27 DIAGNOSIS — E78.5 HYPERLIPIDEMIA, UNSPECIFIED HYPERLIPIDEMIA TYPE: ICD-10-CM

## 2024-11-27 DIAGNOSIS — E78.41 HIGH SERUM LIPOPROTEIN(A): ICD-10-CM

## 2024-11-27 DIAGNOSIS — G47.33 OSA (OBSTRUCTIVE SLEEP APNEA): ICD-10-CM

## 2024-11-27 DIAGNOSIS — E29.1 HYPOGONADISM IN MALE: Primary | ICD-10-CM

## 2024-11-27 DIAGNOSIS — Z83.2 FAMILY HISTORY OF FACTOR V LEIDEN MUTATION: ICD-10-CM

## 2024-11-27 PROCEDURE — 99215 OFFICE O/P EST HI 40 MIN: CPT | Performed by: FAMILY MEDICINE

## 2024-11-27 PROCEDURE — 3008F BODY MASS INDEX DOCD: CPT | Performed by: FAMILY MEDICINE

## 2024-11-27 PROCEDURE — 1036F TOBACCO NON-USER: CPT | Performed by: FAMILY MEDICINE

## 2024-11-27 RX ORDER — TESTOSTERONE CYPIONATE 200 MG/ML
200 INJECTION, SOLUTION INTRAMUSCULAR
Qty: 4 ML | Refills: 2 | Status: SHIPPED | OUTPATIENT
Start: 2024-11-27 | End: 2025-02-25

## 2024-11-27 RX ORDER — ROSUVASTATIN CALCIUM 40 MG/1
40 TABLET, COATED ORAL NIGHTLY
Qty: 90 TABLET | Refills: 1 | Status: SHIPPED | OUTPATIENT
Start: 2024-11-27 | End: 2025-05-26

## 2024-11-27 ASSESSMENT — PATIENT HEALTH QUESTIONNAIRE - PHQ9
1. LITTLE INTEREST OR PLEASURE IN DOING THINGS: NOT AT ALL
SUM OF ALL RESPONSES TO PHQ9 QUESTIONS 1 AND 2: 0
2. FEELING DOWN, DEPRESSED OR HOPELESS: NOT AT ALL

## 2024-11-27 NOTE — PROGRESS NOTES
"Subjective   Patient ID: Zeke Cifuentes is a 44 y.o. male who presents for Med Refill.    HPI Pt is here for refills of medications to treat the following medical conditions. Unless otherwise stated, these conditions are well-controlled, pt is taking medications s Rx, and there are no reported side effects to medications or other treatment: Hypogonadism is male - Last injection was Wednesday, Hyperlipidemia. Patient had labs drawn prior.     Also here for 21 day follow up after using CPAP machine. States it does help when using. He cannot tolerate the mask. Is following with Dr. Raghavendra Lazcano Sleep Specialist in regards to get the Inspire implant.     Also states his father has Factor 5.    Review of Systems   All other systems reviewed and are negative.      Objective   /60   Pulse 70   Ht 1.816 m (5' 11.5\")   Wt 102 kg (225 lb)   SpO2 97%   BMI 30.94 kg/m²     Physical Exam  Constitutional:       Appearance: Normal appearance.   Eyes:      Conjunctiva/sclera: Conjunctivae normal.   Cardiovascular:      Rate and Rhythm: Normal rate and regular rhythm.   Pulmonary:      Effort: Pulmonary effort is normal.      Breath sounds: Normal breath sounds.   Abdominal:      Palpations: Abdomen is soft.   Musculoskeletal:         General: Normal range of motion.   Skin:     General: Skin is warm and dry.   Neurological:      Mental Status: He is alert.   Psychiatric:         Mood and Affect: Mood normal.         Assessment/Plan   Diagnoses and all orders for this visit:  Hypogonadism in male  -     testosterone cypionate (Depo-Testosterone) 200 mg/mL injection; Inject 1 mL (200 mg) into the muscle every 7 days.  Hyperlipidemia, unspecified hyperlipidemia type  -     rosuvastatin (Crestor) 40 mg tablet; Take 1 tablet (40 mg) by mouth once daily at bedtime.  High serum lipoprotein(a)  -     rosuvastatin (Crestor) 40 mg tablet; Take 1 tablet (40 mg) by mouth once daily at bedtime.  TYSHAWN (obstructive sleep apnea)   "      - Currently using his CPAP as Rx without problems.        - He is considering an Aspire system  Family history of factor V Leiden mutation  -     Factor V Leiden; Future

## 2024-12-05 ENCOUNTER — LAB (OUTPATIENT)
Dept: LAB | Facility: LAB | Age: 44
End: 2024-12-05
Payer: COMMERCIAL

## 2024-12-05 DIAGNOSIS — Z83.2 FAMILY HISTORY OF FACTOR V LEIDEN MUTATION: ICD-10-CM

## 2024-12-05 PROCEDURE — 81241 F5 GENE: CPT

## 2024-12-09 LAB
ELECTRONICALLY SIGNED BY: ABNORMAL
FACTOR V LEIDEN INTERPRETATION: ABNORMAL
FACTOR V LEIDEN RESULT: ABNORMAL

## 2024-12-11 DIAGNOSIS — E29.1 HYPOGONADISM IN MALE: ICD-10-CM

## 2024-12-12 DIAGNOSIS — E29.1 HYPOGONADISM IN MALE: ICD-10-CM

## 2024-12-12 RX ORDER — TESTOSTERONE CYPIONATE 200 MG/ML
INJECTION, SOLUTION INTRAMUSCULAR
Qty: 4 ML | Refills: 0 | OUTPATIENT
Start: 2024-12-12

## 2024-12-13 RX ORDER — TESTOSTERONE CYPIONATE 200 MG/ML
INJECTION, SOLUTION INTRAMUSCULAR
Qty: 4 ML | Refills: 0 | OUTPATIENT
Start: 2024-12-13

## 2024-12-16 ENCOUNTER — APPOINTMENT (OUTPATIENT)
Dept: PRIMARY CARE | Facility: CLINIC | Age: 44
End: 2024-12-16
Payer: COMMERCIAL

## 2024-12-16 DIAGNOSIS — D68.51 FACTOR 5 LEIDEN MUTATION, HETEROZYGOUS (MULTI): Primary | ICD-10-CM

## 2024-12-16 PROCEDURE — 99214 OFFICE O/P EST MOD 30 MIN: CPT | Performed by: FAMILY MEDICINE

## 2024-12-16 NOTE — PROGRESS NOTES
Subjective   Patient ID: Zeke Cifuentes is a 44 y.o. male who presents for Follow-up.    HPI     Patient is doing a virtual visit today to go over his blood work, specifically factor v, and what it means that it was positive for him.     Review of Systems   All other systems reviewed and are negative.      Objective   There were no vitals taken for this visit.    Physical Exam  Constitutional:       Appearance: Normal appearance.   Eyes:      Conjunctiva/sclera: Conjunctivae normal.   Cardiovascular:      Rate and Rhythm: Normal rate and regular rhythm.   Pulmonary:      Effort: Pulmonary effort is normal.      Breath sounds: Normal breath sounds.   Abdominal:      Palpations: Abdomen is soft.   Musculoskeletal:         General: Normal range of motion.   Skin:     General: Skin is warm and dry.   Neurological:      Mental Status: He is alert.   Psychiatric:         Mood and Affect: Mood normal.         Assessment/Plan   Diagnoses and all orders for this visit:  Factor 5 Leiden mutation, heterozygous (Multi)        - Extensive discussion regarding Dx and implications. He understands there is a 6-8 x chance of clotting. We discussed concerns such as LE edema/pain, chest pain/SOB and need to go to ED with any of those Sx.        - He does take testosterone and he does understand the increased risk of VTE with TRT. He plans to add baby ASA every day.

## 2025-03-06 DIAGNOSIS — E29.1 HYPOGONADISM IN MALE: ICD-10-CM

## 2025-03-10 LAB — TESTOST SERPL-MCNC: 1085 NG/DL (ref 250–827)

## 2025-03-13 ENCOUNTER — APPOINTMENT (OUTPATIENT)
Dept: OTOLARYNGOLOGY | Facility: CLINIC | Age: 45
End: 2025-03-13
Payer: COMMERCIAL

## 2025-03-13 ENCOUNTER — TELEPHONE (OUTPATIENT)
Dept: PRIMARY CARE | Facility: CLINIC | Age: 45
End: 2025-03-13

## 2025-03-13 DIAGNOSIS — E29.1 HYPOGONADISM IN MALE: ICD-10-CM

## 2025-03-13 NOTE — TELEPHONE ENCOUNTER
Patient called in requesting a refill of Testosterone, will be out of medication before is next office visit.   Rx called into  pharmacy via phone [place] pharmacist per protocol.

## 2025-03-21 RX ORDER — TESTOSTERONE CYPIONATE 200 MG/ML
200 INJECTION, SOLUTION INTRAMUSCULAR
Qty: 4 ML | Refills: 0 | OUTPATIENT
Start: 2025-03-21 | End: 2025-04-20

## 2025-03-27 ENCOUNTER — APPOINTMENT (OUTPATIENT)
Dept: OTOLARYNGOLOGY | Facility: CLINIC | Age: 45
End: 2025-03-27
Payer: COMMERCIAL

## 2025-04-01 ENCOUNTER — APPOINTMENT (OUTPATIENT)
Dept: PRIMARY CARE | Facility: CLINIC | Age: 45
End: 2025-04-01
Payer: COMMERCIAL

## 2025-04-01 VITALS
BODY MASS INDEX: 29.39 KG/M2 | SYSTOLIC BLOOD PRESSURE: 118 MMHG | HEART RATE: 83 BPM | HEIGHT: 72 IN | OXYGEN SATURATION: 97 % | WEIGHT: 217 LBS | DIASTOLIC BLOOD PRESSURE: 60 MMHG

## 2025-04-01 DIAGNOSIS — E78.41 HIGH SERUM LIPOPROTEIN(A): ICD-10-CM

## 2025-04-01 DIAGNOSIS — E78.5 HYPERLIPIDEMIA, UNSPECIFIED HYPERLIPIDEMIA TYPE: ICD-10-CM

## 2025-04-01 DIAGNOSIS — E29.1 HYPOGONADISM IN MALE: ICD-10-CM

## 2025-04-01 PROCEDURE — 3008F BODY MASS INDEX DOCD: CPT | Performed by: FAMILY MEDICINE

## 2025-04-01 PROCEDURE — 99214 OFFICE O/P EST MOD 30 MIN: CPT | Performed by: FAMILY MEDICINE

## 2025-04-01 PROCEDURE — 1036F TOBACCO NON-USER: CPT | Performed by: FAMILY MEDICINE

## 2025-04-01 RX ORDER — TESTOSTERONE CYPIONATE 200 MG/ML
200 INJECTION, SOLUTION INTRAMUSCULAR
Qty: 4 ML | Refills: 2 | Status: SHIPPED | OUTPATIENT
Start: 2025-04-01 | End: 2025-06-30

## 2025-04-01 RX ORDER — ROSUVASTATIN CALCIUM 40 MG/1
40 TABLET, COATED ORAL NIGHTLY
Qty: 90 TABLET | Refills: 1 | Status: SHIPPED | OUTPATIENT
Start: 2025-04-01 | End: 2025-04-02 | Stop reason: SDUPTHER

## 2025-04-01 RX ORDER — ASPIRIN 81 MG/1
81 TABLET ORAL DAILY
COMMUNITY

## 2025-04-01 NOTE — PROGRESS NOTES
"Subjective   Patient ID: Zeke Cifuentes is a 45 y.o. male who presents for Med Refill.    HPI Pt is here for refills of medications to treat the following medical conditions. Unless otherwise stated, these conditions are well-controlled, pt is taking medications s Rx, and there are no reported side effects to medications or other treatment: Hypogonadism - Last injection was this past Friday. Patient did have testosterone level drawn prior to appointment. Hyperlipidemia. Is not fasting for labs.     Review of Systems   All other systems reviewed and are negative.      Objective   /60   Pulse 83   Ht 1.816 m (5' 11.5\")   Wt 98.4 kg (217 lb)   SpO2 97%   BMI 29.84 kg/m²     Physical Exam  Constitutional:       Appearance: Normal appearance.   Eyes:      Conjunctiva/sclera: Conjunctivae normal.   Cardiovascular:      Rate and Rhythm: Normal rate and regular rhythm.   Pulmonary:      Effort: Pulmonary effort is normal.      Breath sounds: Normal breath sounds.   Abdominal:      Palpations: Abdomen is soft.   Musculoskeletal:         General: Normal range of motion.   Skin:     General: Skin is warm and dry.   Neurological:      Mental Status: He is alert.   Psychiatric:         Mood and Affect: Mood normal.         Assessment/Plan   Diagnoses and all orders for this visit:  Hyperlipidemia, unspecified hyperlipidemia type  -     Comprehensive metabolic panel; Future  -     Lipid Panel Non-Fasting; Future  -     rosuvastatin (Crestor) 40 mg tablet; Take 1 tablet (40 mg) by mouth once daily at bedtime.  High serum lipoprotein(a)  -     rosuvastatin (Crestor) 40 mg tablet; Take 1 tablet (40 mg) by mouth once daily at bedtime.  Hypogonadism in male  -     testosterone cypionate (Depo-Testosterone) 200 mg/mL injection; Inject 1 mL (200 mg) into the muscle every 7 days.       "

## 2025-04-02 DIAGNOSIS — E78.41 HIGH SERUM LIPOPROTEIN(A): ICD-10-CM

## 2025-04-02 DIAGNOSIS — E78.5 HYPERLIPIDEMIA, UNSPECIFIED HYPERLIPIDEMIA TYPE: ICD-10-CM

## 2025-04-02 LAB
ALBUMIN SERPL-MCNC: 4.9 G/DL (ref 3.6–5.1)
ALP SERPL-CCNC: 52 U/L (ref 36–130)
ALT SERPL-CCNC: 63 U/L (ref 9–46)
ANION GAP SERPL CALCULATED.4IONS-SCNC: 14 MMOL/L (CALC) (ref 7–17)
AST SERPL-CCNC: 40 U/L (ref 10–40)
BILIRUB SERPL-MCNC: 1.1 MG/DL (ref 0.2–1.2)
BUN SERPL-MCNC: 18 MG/DL (ref 7–25)
CALCIUM SERPL-MCNC: 9.4 MG/DL (ref 8.6–10.3)
CHLORIDE SERPL-SCNC: 102 MMOL/L (ref 98–110)
CHOLEST SERPL-MCNC: 139 MG/DL
CHOLEST/HDLC SERPL: 3.2 (CALC)
CO2 SERPL-SCNC: 23 MMOL/L (ref 20–32)
CREAT SERPL-MCNC: 1.01 MG/DL (ref 0.6–1.29)
EGFRCR SERPLBLD CKD-EPI 2021: 94 ML/MIN/1.73M2
GLUCOSE SERPL-MCNC: 56 MG/DL (ref 65–99)
HDLC SERPL-MCNC: 44 MG/DL
LDLC SERPL CALC-MCNC: 77 MG/DL (CALC)
NONHDLC SERPL-MCNC: 95 MG/DL (CALC)
POTASSIUM SERPL-SCNC: 4.2 MMOL/L (ref 3.5–5.3)
PROT SERPL-MCNC: 7.1 G/DL (ref 6.1–8.1)
SODIUM SERPL-SCNC: 139 MMOL/L (ref 135–146)
TRIGL SERPL-MCNC: 101 MG/DL

## 2025-04-02 NOTE — TELEPHONE ENCOUNTER
I forgot that this medication for some reason my insurance makes me go through [employer]..       Can you resubmit this cholesterol medicine over to [facility] on [address]? Thank you

## 2025-04-03 RX ORDER — ROSUVASTATIN CALCIUM 40 MG/1
40 TABLET, COATED ORAL NIGHTLY
Qty: 90 TABLET | Refills: 1 | Status: SHIPPED | OUTPATIENT
Start: 2025-04-03 | End: 2025-09-30

## 2025-05-08 ENCOUNTER — APPOINTMENT (OUTPATIENT)
Dept: OTOLARYNGOLOGY | Facility: CLINIC | Age: 45
End: 2025-05-08
Payer: COMMERCIAL

## 2025-05-08 ENCOUNTER — TELEPHONE (OUTPATIENT)
Dept: CARDIOLOGY | Facility: CLINIC | Age: 45
End: 2025-05-08

## 2025-06-23 ENCOUNTER — APPOINTMENT (OUTPATIENT)
Dept: PRIMARY CARE | Facility: CLINIC | Age: 45
End: 2025-06-23
Payer: COMMERCIAL

## 2025-06-25 NOTE — PROGRESS NOTES
Sinus & Skull Base Surgery    HPI   5/11/23 - sinus infection about 1 month ago, s/p doxycycline and prednisone. Sx's improved however c/o persistent L sided congestion. Opted to discontinue mupirocin voluntarily 2/2 no significant improvement. He was applying the ointment with his finger previously. Prescribed mupirocin again today and reviewed proper application instructions as well. Otherwise, he is now about 2 years post-op with no recurrence and doing well overall.   3/15/24 - The patient was doing well with the sinuses/nose over the past year. He reports having Covid approximately 20-25 days ago (in mid-to-late February 2024). He reports having nasal obstruction at night. He is hoping this is only related to his allergies. He has been using Xhance for the past 4 days. He is also taking allergy medicine. He has been using Afrin for the past week.  6/26/25 - Patient presents for a followup visit. He feels that he has recovered well since having Covid in March 2024. He reports good sense of smell. He does saline irrigations as needed if he gets a cold. He used Flonase for 1 month in approx March but had nosebleeds and discontinued it.    Operation/Procedures:  1. Bilateral endoscopic maxillary antrostomies with removal of tissue from sinuses - modifier 22  2. Septoplasty  3. Bilateral inferior turbinate submucous resection  4. Bilateral aisha bullosa resection  5. Imaged guidance navigation - extradural     Operative Findings:  1. 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 Pierson Splint sutured to the septum bilaterally  3. Absorbable hemostatic material in the ethmoids    Assessment   Zeke BO Cifuentes is a 45 y.o. male h/o CRS, DNS, s/p FESS (maxillary antrostomy, aisha bullosa resection). The patient had surgery 03/15/21.  3/24/21- Expected post op swelling today. Debridement performed as above.  4/15/21- Expected post op swelling,  resolving infection of the right maxillary sinus. Debridement performed as above. Continue irrigations and xhance.   5/19/21- Anterior septal crusting, start mupirocin BID, irrigations and Xhance to once daily.  10/20/21- Patient doing well, mild anterior nasal dryness, stopped Xhance.   4/14/22- doing well overall, start mupirocin, avoid blowing nose for 1 month, nasal hygeine  5/11/23 - sinus infection about 1 month ago, s/p doxy and prednisone. Sx's improved. With chronic left nasal dryness. Advised mupirocin x 1 month. Rx and reduce/careful with nose hair trimming.  3/15/24 - No infection. Some ITH. Pt had Covid in mid-to-late February 2024. Rec Xhance daily for at least 1 month, continue for 2 months if not improved. Continue saline irrigations PRN.  6/26/25 - Doing well over last year. Sinuses PRISTINE. Turbs look more normal today. Continue seasonal Flonase PRN, saline irrig PRN. Rec saline gel if he restarts Flonase.    Plan   Continue Flonase as needed seasonally. Discussed how to use appropriately.  Recommended nasal moisturization with antibiotic ointment, saline gel, or Vaseline after each use of Flonase. Patient was instructed to swipe a pea-sized amount into anterior nares and sniff back. Patient was instructed to avoid applying it with Q-tips and use it after any nasal sprays or irrigations.  Continue saline irrigations as needed. If the patient develops thick yellow green drainage, smell/taste loss, or facial pressure, he was instructed to start doing nasal saline irrigations twice daily. If he continues to have excessive thick yellow green drainage, smell/taste loss, or facial pressure for more than 5-6 days, he was instructed to send our office a MyChart message or call our office.  Follow up with me in 1 year.    Jimenez Campbell MD [facility]  Division of Rhinology, Sinus, and Skull Base Surgery       Exam   General: This is a healthy appearing male who appears his stated age. The patient is alert and  appropriately verbally conversant without hoarseness.  Face: The face was inspected and no cutaneous masses or lesions were visualized. There was no erythema or edema noted. Facial movement was symmetric without weakness. No skin lesions were detected.   Eyes: Extra-ocular muscle function was intact. No nystagmus was observed. Pupils were equal.      Nose: Examination of the nose revealed the nasal dorsum to be midline. Intranasal exam reveals the septum is healthy without perforation. The inferior turbinates were hypertrophic and edematous with drainage. See below procedure note as applicable for further exam.    Procedure Note:  Procedure: Nasal endoscopy - bilateral  Indication: Chronic rhinosinusitis, post operative from sinus surgery  Informed Consent obtained: risks, benefits, alternatives, and expectations discussed with patient and the patient wishes to proceed.     Findings: After anesthesia and decongestion with topical lidocaine and Afrin spray, the nasal cavities were examined with a zero and/or 30-degree endoscope. Nasal cavity is patent and clear. The maxillary sinuses are widely patent no edema. The inferior turbinates were much less edematous today. Otherwise the middle turbinates, superior turbinates, and meatuses are normal and sphenoethmoid recess, nasal cavity and nasopharynx are normal. There is no CSF leak. Well-healed overall. The patient tolerated the procedure well and there were no complications.       Scribe Attestation  By signing my name below, I, Denisse Azar, attest that this documentation has been prepared under the direction and in the presence of Jimenez Campbell MD.

## 2025-06-25 NOTE — PATIENT INSTRUCTIONS
PATIENT INSTRUCTIONS ARE COMPLETE and READY TO PRINT    Flonase:  If you ever want to restart Flonase, be sure to aim the Flonase spray slightly outwards toward the corner of the eye on the same side nostril. I also recommend using saline gel or Vaseline after each time you use Flonase.  Use up to a maximum of 1 spray of Flonase in each nostril twice daily.  If you are doing an irrigation and using the nasal spray, please irrigate first, then use Flonase. Otherwise, you will wash Flonase out of your nose with the irrigation.    Saline irrigations:  I recommend occasional saline irrigations as needed. Saline irrigations help clean away crusts more gently than blowing your nose.  If you decide to do a nasal irrigation, please irrigate first before using Flonase. Otherwise, you will wash Flonase out of your nose with the irrigation.    Sinus infection:  If you develop copious thick yellow green drainage, facial pressure, or loss of smell/taste, please start doing saline irrigations twice daily for about 5 days. This can sometimes prevent a full-blown sinus infection from developing. If you continue to have thick yellow green drainage, facial pressure, or loss of smell/taste for more than 5-6 days, please send my office a [employer] message or call [phone].    Saline recipe:  Use distilled water. Prepare 8 ounces (or 240 mL) of distilled water with 1/2 teaspoon of baking soda and 1/2 teaspoon of table salt. Shake bottle to dissolve. Avoid using tap water. If you must use tap water, be sure to boil it, then let it cool down.     Follow up:  Please follow up with me in 12 months. If you have questions, feel free to contact my office at [phone] or send us a [employer] message.     Scribe Attestation  By signing my name below, I, Denisse Azar, attest that this documentation has been prepared under the direction and in the presence of Jimenez Campbell MD.

## 2025-06-26 ENCOUNTER — APPOINTMENT (OUTPATIENT)
Dept: OTOLARYNGOLOGY | Facility: CLINIC | Age: 45
End: 2025-06-26
Payer: COMMERCIAL

## 2025-06-26 VITALS — HEIGHT: 72 IN | BODY MASS INDEX: 29.39 KG/M2 | WEIGHT: 217 LBS

## 2025-06-26 DIAGNOSIS — J32.0 CHRONIC MAXILLARY SINUSITIS: ICD-10-CM

## 2025-06-26 DIAGNOSIS — J31.0 CHRONIC RHINITIS: Primary | ICD-10-CM

## 2025-06-26 PROCEDURE — 1036F TOBACCO NON-USER: CPT | Performed by: OTOLARYNGOLOGY

## 2025-06-26 PROCEDURE — 3008F BODY MASS INDEX DOCD: CPT | Performed by: OTOLARYNGOLOGY

## 2025-06-26 PROCEDURE — 31231 NASAL ENDOSCOPY DX: CPT | Performed by: OTOLARYNGOLOGY

## 2025-06-26 PROCEDURE — 99213 OFFICE O/P EST LOW 20 MIN: CPT | Performed by: OTOLARYNGOLOGY

## 2025-07-02 ENCOUNTER — APPOINTMENT (OUTPATIENT)
Dept: PRIMARY CARE | Facility: CLINIC | Age: 45
End: 2025-07-02
Payer: COMMERCIAL

## 2025-07-02 VITALS
HEART RATE: 80 BPM | BODY MASS INDEX: 30.34 KG/M2 | DIASTOLIC BLOOD PRESSURE: 80 MMHG | HEIGHT: 72 IN | WEIGHT: 224 LBS | OXYGEN SATURATION: 97 % | SYSTOLIC BLOOD PRESSURE: 118 MMHG | TEMPERATURE: 97.9 F

## 2025-07-02 DIAGNOSIS — E29.1 HYPOGONADISM IN MALE: Primary | ICD-10-CM

## 2025-07-02 PROBLEM — R63.2 BINGE EATING: Status: ACTIVE | Noted: 2025-07-02

## 2025-07-02 PROBLEM — R63.2 POLYPHAGIA: Status: ACTIVE | Noted: 2025-07-02

## 2025-07-02 PROBLEM — K52.9 CHRONIC DIARRHEA: Status: ACTIVE | Noted: 2025-07-02

## 2025-07-02 PROBLEM — N50.82 PAIN IN SCROTUM: Status: RESOLVED | Noted: 2025-07-02 | Resolved: 2025-07-02

## 2025-07-02 PROBLEM — J34.3 HYPERTROPHY OF INFERIOR NASAL TURBINATE: Status: ACTIVE | Noted: 2025-07-02

## 2025-07-02 PROBLEM — N50.82 ACUTE PAIN IN SCROTUM: Status: RESOLVED | Noted: 2025-07-02 | Resolved: 2025-07-02

## 2025-07-02 PROBLEM — J33.8 NASAL SINUS POLYP: Status: ACTIVE | Noted: 2025-07-02

## 2025-07-02 PROBLEM — K12.1 STOMATITIS AND MUCOSITIS: Status: ACTIVE | Noted: 2025-07-02

## 2025-07-02 PROBLEM — N50.3 CYST OF EPIDIDYMIS: Status: ACTIVE | Noted: 2025-07-02

## 2025-07-02 PROBLEM — N52.9 ERECTILE DYSFUNCTION: Status: ACTIVE | Noted: 2025-07-02

## 2025-07-02 PROBLEM — K62.5 RECTAL HEMORRHAGE: Status: RESOLVED | Noted: 2025-07-02 | Resolved: 2025-07-02

## 2025-07-02 PROBLEM — H65.90 FLUID COLLECTION OF MIDDLE EAR: Status: RESOLVED | Noted: 2025-07-02 | Resolved: 2025-07-02

## 2025-07-02 PROBLEM — N50.9 DISORDER OF MALE GENITAL ORGANS: Status: ACTIVE | Noted: 2025-07-02

## 2025-07-02 PROBLEM — S92.422A CLOSED DISPLACED FRACTURE OF DISTAL PHALANX OF LEFT GREAT TOE: Status: RESOLVED | Noted: 2021-03-22 | Resolved: 2025-07-02

## 2025-07-02 PROBLEM — R21 RASH: Status: RESOLVED | Noted: 2025-07-02 | Resolved: 2025-07-02

## 2025-07-02 PROBLEM — H69.93 EUSTACHIAN TUBE DYSFUNCTION, BILATERAL: Status: RESOLVED | Noted: 2025-07-02 | Resolved: 2025-07-02

## 2025-07-02 PROBLEM — K58.9 IBS (IRRITABLE BOWEL SYNDROME): Status: ACTIVE | Noted: 2025-07-02

## 2025-07-02 PROBLEM — L70.0 CYSTIC ACNE: Status: ACTIVE | Noted: 2025-07-02

## 2025-07-02 PROBLEM — K62.5 RECTAL BLEEDING: Status: RESOLVED | Noted: 2025-07-02 | Resolved: 2025-07-02

## 2025-07-02 PROBLEM — D72.829 LEUKOCYTOSIS: Status: ACTIVE | Noted: 2025-07-02

## 2025-07-02 PROBLEM — J32.0 MAXILLARY SINUSITIS, CHRONIC: Status: ACTIVE | Noted: 2025-07-02

## 2025-07-02 PROBLEM — H69.90 DYSFUNCTION OF EUSTACHIAN TUBE: Status: RESOLVED | Noted: 2025-07-02 | Resolved: 2025-07-02

## 2025-07-02 PROBLEM — R53.83 FATIGUE: Status: RESOLVED | Noted: 2025-07-02 | Resolved: 2025-07-02

## 2025-07-02 PROBLEM — H90.3 SENSORINEURAL HEARING LOSS (SNHL) OF BOTH EARS: Status: ACTIVE | Noted: 2025-07-02

## 2025-07-02 PROBLEM — E78.5 HYPERLIPIDEMIA: Status: ACTIVE | Noted: 2023-06-01

## 2025-07-02 PROBLEM — K12.30 STOMATITIS AND MUCOSITIS: Status: ACTIVE | Noted: 2025-07-02

## 2025-07-02 PROBLEM — R79.89 ABNORMAL CBC: Status: RESOLVED | Noted: 2025-07-02 | Resolved: 2025-07-02

## 2025-07-02 PROBLEM — R79.89 ABNORMAL COMPLETE BLOOD COUNT: Status: RESOLVED | Noted: 2023-06-01 | Resolved: 2025-07-02

## 2025-07-02 PROBLEM — R50.9 FEVER: Status: RESOLVED | Noted: 2025-07-02 | Resolved: 2025-07-02

## 2025-07-02 PROBLEM — D68.51 FACTOR 5 LEIDEN MUTATION, HETEROZYGOUS (MULTI): Status: ACTIVE | Noted: 2025-07-02

## 2025-07-02 PROBLEM — J34.2 DEVIATED NASAL SEPTUM: Status: ACTIVE | Noted: 2025-07-02

## 2025-07-02 PROBLEM — R07.9 CHEST PAIN, UNSPECIFIED: Status: ACTIVE | Noted: 2025-07-02

## 2025-07-02 LAB — TESTOST SERPL-MCNC: 842 NG/DL (ref 250–827)

## 2025-07-02 PROCEDURE — 99214 OFFICE O/P EST MOD 30 MIN: CPT | Performed by: FAMILY MEDICINE

## 2025-07-02 PROCEDURE — 3008F BODY MASS INDEX DOCD: CPT | Performed by: FAMILY MEDICINE

## 2025-07-02 PROCEDURE — 1036F TOBACCO NON-USER: CPT | Performed by: FAMILY MEDICINE

## 2025-07-02 RX ORDER — TESTOSTERONE CYPIONATE 200 MG/ML
200 INJECTION, SOLUTION INTRAMUSCULAR
Qty: 4 ML | Refills: 2 | Status: SHIPPED | OUTPATIENT
Start: 2025-07-02 | End: 2025-09-30

## 2025-07-02 NOTE — PROGRESS NOTES
"Subjective   Patient ID: Zeke Cifuentes is a 45 y.o. male who presents for Med Refill.    HPI     Pt is here for refills of medications to treat the following medical conditions. Unless otherwise stated, these conditions are well-controlled, pt is taking medications as Rx, and there are no reported side effects to medications or other treatment: hypogonadism in male - last testosterone injection was on 06/27/2025. Feels that Sx are controlled.     Review of Systems   All other systems reviewed and are negative.      Objective   /80   Pulse 80   Temp 36.6 °C (97.9 °F)   Ht 1.816 m (5' 11.5\")   Wt 102 kg (224 lb)   SpO2 97%   BMI 30.81 kg/m²     Physical Exam  Vitals reviewed.   Constitutional:       General: He is awake.      Appearance: Normal appearance. He is well-developed.   HENT:      Head: Normocephalic and atraumatic.   Cardiovascular:      Rate and Rhythm: Normal rate.   Pulmonary:      Effort: Pulmonary effort is normal.   Musculoskeletal:      Cervical back: Full passive range of motion without pain.      Right lower leg: No edema.      Left lower leg: No edema.   Skin:     General: Skin is warm and dry.      Findings: No lesion or rash.   Neurological:      General: No focal deficit present.      Mental Status: He is alert and oriented to person, place, and time.      Cranial Nerves: No facial asymmetry.      Motor: Motor function is intact.      Gait: Gait is intact.   Psychiatric:         Attention and Perception: Attention normal.         Mood and Affect: Mood and affect normal.         Assessment/Plan   Diagnoses and all orders for this visit:  Factor 5 Leiden mutation, heterozygous (Multi)  -     Drug Screen, Urine With Reflex to Confirmation; Future  -     Testosterone; Future  -     testosterone cypionate (Depo-Testosterone) 200 mg/mL injection; Inject 1 mL (200 mg) into the muscle every 7 days.       "

## 2025-07-03 LAB
AMPHETAMINES UR QL: NEGATIVE NG/ML
BARBITURATES UR QL: NEGATIVE NG/ML
BENZODIAZ UR QL: NEGATIVE NG/ML
BZE UR QL: NEGATIVE NG/ML
CREAT UR-MCNC: 77.5 MG/DL
FENTANYL UR QL SCN: NEGATIVE NG/ML
METHADONE UR QL: NEGATIVE NG/ML
OPIATES UR QL: NEGATIVE NG/ML
OXIDANTS UR QL: NEGATIVE MCG/ML
OXYCODONE UR QL: NEGATIVE NG/ML
PCP UR QL: NEGATIVE NG/ML
PH UR: 6.4 [PH] (ref 4.5–9)
QUEST NOTES AND COMMENTS: NORMAL
THC UR QL: NEGATIVE NG/ML

## 2025-08-27 DIAGNOSIS — N52.9 ERECTILE DYSFUNCTION, UNSPECIFIED ERECTILE DYSFUNCTION TYPE: ICD-10-CM

## 2025-08-28 RX ORDER — SILDENAFIL 100 MG/1
100 TABLET, FILM COATED ORAL AS NEEDED
Qty: 8 TABLET | Refills: 0 | Status: SHIPPED | OUTPATIENT
Start: 2025-08-28

## 2026-06-26 ENCOUNTER — APPOINTMENT (OUTPATIENT)
Dept: OTOLARYNGOLOGY | Facility: CLINIC | Age: 46
End: 2026-06-26
Payer: COMMERCIAL